# Patient Record
Sex: FEMALE | Race: WHITE | URBAN - METROPOLITAN AREA
[De-identification: names, ages, dates, MRNs, and addresses within clinical notes are randomized per-mention and may not be internally consistent; named-entity substitution may affect disease eponyms.]

---

## 2017-04-04 ENCOUNTER — OFFICE VISIT (OUTPATIENT)
Dept: ORTHOPEDIC SURGERY | Age: 67
End: 2017-04-04

## 2017-04-04 VITALS — HEIGHT: 67 IN | BODY MASS INDEX: 19.3 KG/M2 | WEIGHT: 123 LBS

## 2017-04-04 DIAGNOSIS — M25.562 ACUTE PAIN OF LEFT KNEE: Primary | ICD-10-CM

## 2017-04-04 DIAGNOSIS — M76.52 PATELLAR TENDINITIS, LEFT KNEE: ICD-10-CM

## 2017-04-04 PROCEDURE — MISCD377 PATELLAR TENDON STRAP-BREG: Performed by: ORTHOPAEDIC SURGERY

## 2017-04-04 PROCEDURE — 73564 X-RAY EXAM KNEE 4 OR MORE: CPT | Performed by: ORTHOPAEDIC SURGERY

## 2017-04-04 PROCEDURE — 99203 OFFICE O/P NEW LOW 30 MIN: CPT | Performed by: ORTHOPAEDIC SURGERY

## 2017-04-05 ENCOUNTER — HOSPITAL ENCOUNTER (OUTPATIENT)
Dept: PHYSICAL THERAPY | Age: 67
Discharge: HOME | End: 2017-04-30
Admitting: ORTHOPAEDIC SURGERY

## 2017-04-10 ENCOUNTER — HOSPITAL ENCOUNTER (OUTPATIENT)
Dept: PHYSICAL THERAPY | Age: 67
Discharge: HOME | End: 2017-04-11
Admitting: ORTHOPAEDIC SURGERY

## 2017-04-12 ENCOUNTER — HOSPITAL ENCOUNTER (OUTPATIENT)
Dept: PHYSICAL THERAPY | Age: 67
Discharge: HOME | End: 2017-04-13
Admitting: ORTHOPAEDIC SURGERY

## 2020-06-16 ENCOUNTER — OFFICE VISIT (OUTPATIENT)
Dept: ORTHOPEDIC SURGERY | Age: 70
End: 2020-06-16
Payer: MEDICARE

## 2020-06-16 VITALS — RESPIRATION RATE: 17 BRPM | WEIGHT: 125 LBS | HEIGHT: 67 IN | BODY MASS INDEX: 19.62 KG/M2

## 2020-06-16 PROCEDURE — 4040F PNEUMOC VAC/ADMIN/RCVD: CPT | Performed by: ORTHOPAEDIC SURGERY

## 2020-06-16 PROCEDURE — G8420 CALC BMI NORM PARAMETERS: HCPCS | Performed by: ORTHOPAEDIC SURGERY

## 2020-06-16 PROCEDURE — G8427 DOCREV CUR MEDS BY ELIG CLIN: HCPCS | Performed by: ORTHOPAEDIC SURGERY

## 2020-06-16 PROCEDURE — 4004F PT TOBACCO SCREEN RCVD TLK: CPT | Performed by: ORTHOPAEDIC SURGERY

## 2020-06-16 PROCEDURE — 1123F ACP DISCUSS/DSCN MKR DOCD: CPT | Performed by: ORTHOPAEDIC SURGERY

## 2020-06-16 PROCEDURE — 1090F PRES/ABSN URINE INCON ASSESS: CPT | Performed by: ORTHOPAEDIC SURGERY

## 2020-06-16 PROCEDURE — 3017F COLORECTAL CA SCREEN DOC REV: CPT | Performed by: ORTHOPAEDIC SURGERY

## 2020-06-16 PROCEDURE — G8400 PT W/DXA NO RESULTS DOC: HCPCS | Performed by: ORTHOPAEDIC SURGERY

## 2020-06-16 PROCEDURE — 99203 OFFICE O/P NEW LOW 30 MIN: CPT | Performed by: ORTHOPAEDIC SURGERY

## 2020-06-16 NOTE — PROGRESS NOTES
Chief Complaint    Knee Pain (left knee)      History of Present Illness:  Ricki Christiansen is a 81 y.o. female. She is here today for evaluation of her left knee. She is a very avid runner and does run marathons on a frequent basis. She states that she was running several weeks ago and started noticing some swelling within her knee. She states that she was not having any significant pain but she noticed swelling along with some crepitus within the knee. She states the crepitus is there especially with any type of knee flexion especially with any type of kneeling or squatting. She denies radicular pain or numbness or tingling. She has taken the last several days off of running and has noticed some improvement. She is here today mainly to make sure that she can run again           Medical History:  Patient's medications, allergies, past medical, surgical, social and family histories were reviewed and updated as appropriate. Review of Systems:  Pertinent items are noted in HPI  Review of systems reviewed from Patient History Form dated on 6/16/20 and available in the patient's chart under the Media tab. Vital Signs:  Resp 17   Ht 5' 7\" (1.702 m)   Wt 125 lb (56.7 kg)   BMI 19.58 kg/m²     General Exam:   Constitutional: Patient is adequately groomed with no evidence of malnutrition  DTRs: Deep tendon reflexes are intact  Mental Status: The patient is oriented to time, place and person. The patient's mood and affect are appropriate. Knee Examination:    Inspection: No significant swelling erythema noted to the left knee today    Palpation: Mild palpable effusion about the left knee. No significant tenderness over the medial lateral joint lines    Range of Motion: 0 to 130 degrees    Strength: Able to do a straight leg raise    Special Tests: Negative Lockman. No pain with varus and valgus stress testing. Negative posterior drawer.   Negative Apley Nadir    Skin: There are no rashes,